# Patient Record
Sex: MALE | Race: WHITE | NOT HISPANIC OR LATINO | Employment: UNEMPLOYED | URBAN - METROPOLITAN AREA
[De-identification: names, ages, dates, MRNs, and addresses within clinical notes are randomized per-mention and may not be internally consistent; named-entity substitution may affect disease eponyms.]

---

## 2020-02-05 ENCOUNTER — TELEPHONE (OUTPATIENT)
Dept: SCHEDULING | Facility: IMAGING CENTER | Age: 25
End: 2020-02-05

## 2020-03-11 ENCOUNTER — OFFICE VISIT (OUTPATIENT)
Dept: MEDICAL GROUP | Facility: MEDICAL CENTER | Age: 25
End: 2020-03-11
Payer: COMMERCIAL

## 2020-03-11 VITALS
BODY MASS INDEX: 18.82 KG/M2 | TEMPERATURE: 97.6 F | DIASTOLIC BLOOD PRESSURE: 72 MMHG | SYSTOLIC BLOOD PRESSURE: 108 MMHG | HEIGHT: 73 IN | OXYGEN SATURATION: 96 % | HEART RATE: 77 BPM | WEIGHT: 142 LBS | RESPIRATION RATE: 18 BRPM

## 2020-03-11 DIAGNOSIS — F31.81 BIPOLAR 2 DISORDER (HCC): ICD-10-CM

## 2020-03-11 DIAGNOSIS — Z00.00 WELL ADULT EXAM: ICD-10-CM

## 2020-03-11 DIAGNOSIS — Z11.3 ROUTINE SCREENING FOR STI (SEXUALLY TRANSMITTED INFECTION): ICD-10-CM

## 2020-03-11 DIAGNOSIS — F41.3 OTHER MIXED ANXIETY DISORDERS: ICD-10-CM

## 2020-03-11 DIAGNOSIS — F33.42 RECURRENT MAJOR DEPRESSIVE DISORDER, IN FULL REMISSION (HCC): ICD-10-CM

## 2020-03-11 PROBLEM — F32.A DEPRESSION: Status: ACTIVE | Noted: 2020-03-11

## 2020-03-11 PROBLEM — F41.9 ANXIETY DISORDER: Status: ACTIVE | Noted: 2020-03-11

## 2020-03-11 PROCEDURE — 99385 PREV VISIT NEW AGE 18-39: CPT | Performed by: INTERNAL MEDICINE

## 2020-03-11 RX ORDER — ALPRAZOLAM 0.25 MG/1
0.25 TABLET ORAL
Qty: 15 TAB | Refills: 0 | Status: SHIPPED | OUTPATIENT
Start: 2020-03-11 | End: 2020-03-26

## 2020-03-11 RX ORDER — SERTRALINE HYDROCHLORIDE 100 MG/1
100 TABLET, FILM COATED ORAL DAILY
COMMUNITY
End: 2020-03-11 | Stop reason: SDUPTHER

## 2020-03-11 RX ORDER — ALPRAZOLAM 0.25 MG/1
TABLET ORAL PRN
COMMUNITY
End: 2020-03-11 | Stop reason: SDUPTHER

## 2020-03-11 RX ORDER — SERTRALINE HYDROCHLORIDE 100 MG/1
100 TABLET, FILM COATED ORAL DAILY
Qty: 90 TAB | Refills: 1 | Status: SHIPPED | OUTPATIENT
Start: 2020-03-11 | End: 2020-09-21

## 2020-03-11 RX ORDER — LAMOTRIGINE 100 MG/1
100 TABLET ORAL 2 TIMES DAILY
COMMUNITY
End: 2020-03-11 | Stop reason: SDUPTHER

## 2020-03-11 RX ORDER — LAMOTRIGINE 100 MG/1
100 TABLET ORAL 2 TIMES DAILY
Qty: 180 TAB | Refills: 1 | Status: SHIPPED | OUTPATIENT
Start: 2020-03-11 | End: 2020-09-21

## 2020-03-11 SDOH — HEALTH STABILITY: MENTAL HEALTH: HOW MANY STANDARD DRINKS CONTAINING ALCOHOL DO YOU HAVE ON A TYPICAL DAY?: 1 OR 2

## 2020-03-11 SDOH — HEALTH STABILITY: MENTAL HEALTH: HOW OFTEN DO YOU HAVE A DRINK CONTAINING ALCOHOL?: 2-4 TIMES A MONTH

## 2020-03-11 ASSESSMENT — PATIENT HEALTH QUESTIONNAIRE - PHQ9: CLINICAL INTERPRETATION OF PHQ2 SCORE: 0

## 2020-03-11 NOTE — ASSESSMENT & PLAN NOTE
Since 2016, he has been on lamictal. Stable. He has not seen psychiatry in years and previous PCP has been refilling.

## 2020-03-11 NOTE — PROGRESS NOTES
New Patient to Establish      CC: health check up, med refill    HPI:   24 y.o. male came into clinic for above. From [place], came here for grad school (2/3 semester now), plan to stay in [place]. Currently in a relationship with .      Bipolar 2 disorder (HCC)  Since 2016, he has been on lamictal. Stable. He has not seen psychiatry in years and previous PCP has been refilling.    Recurrent major depressive disorder, in full remission (HCC)  Since childhood. Has been on medication since 10 years ago. Currently stable with zoloft.     Anxiety disorder  Uses prn alprazolam 0.25 mg once a month or once every few months depending on anxiety attacks.      ROS:   Constitutional: denies any fever, chills, weight loss, appetite loss  Eyes: denies any vision changes.   ENT: no hearing problem  Cardiovascular: denies any chest pain, palpitations   Respiratory: denies SOB, cough, runny nose, sore throat  GI: denies abdominal pain, N/V, constipation, diarrhea  : denies any trouble urinating, dyuria, frequency, urgency  Musculo-skeletal: denies muscle weakness, joint pain  Skin: normal  Neurological: denies weakness, numbness, unsteadiness  Psychological: Mood is at baseline. denies anxiety or depression        Patient Active Problem List    Diagnosis Date Noted   • Bipolar 2 disorder (HCC) 03/11/2020   • Recurrent major depressive disorder, in full remission (HCC) 03/11/2020   • Anxiety disorder 03/11/2020       History reviewed. No pertinent past medical history.    Current Outpatient Medications   Medication Sig Dispense Refill   • sertraline (ZOLOFT) 100 MG Tab Take 1 Tab by mouth every day. 90 Tab 1   • lamoTRIgine (LAMICTAL) 100 MG Tab Take 1 Tab by mouth 2 Times a Day. 180 Tab 1   • ALPRAZolam (XANAX) 0.25 MG Tab Take 1 Tab by mouth 1 time daily as needed for up to 15 days. 15 Tab 0     No current facility-administered medications for this visit.        Allergies as of 03/11/2020   • (Not on File)       Social History      Socioeconomic History   • Marital status: Single     Spouse name: Not on file   • Number of children: Not on file   • Years of education: Not on file   • Highest education level: Not on file   Occupational History   • Not on file   Social Needs   • Financial resource strain: Not on file   • Food insecurity     Worry: Not on file     Inability: Not on file   • Transportation needs     Medical: Not on file     Non-medical: Not on file   Tobacco Use   • Smoking status: Never Smoker   • Smokeless tobacco: Never Used   Substance and Sexual Activity   • Alcohol use: Yes     Alcohol/week: 0.6 oz     Types: 1 Standard drinks or equivalent per week     Frequency: 2-4 times a month     Drinks per session: 1 or 2   • Drug use: Never   • Sexual activity: Yes     Comment: in a relationship   Lifestyle   • Physical activity     Days per week: Not on file     Minutes per session: Not on file   • Stress: Not on file   Relationships   • Social connections     Talks on phone: Not on file     Gets together: Not on file     Attends Restorationist service: Not on file     Active member of club or organization: Not on file     Attends meetings of clubs or organizations: Not on file     Relationship status: Not on file   • Intimate partner violence     Fear of current or ex partner: Not on file     Emotionally abused: Not on file     Physically abused: Not on file     Forced sexual activity: Not on file   Other Topics Concern   • Not on file   Social History Narrative    In graduate school       Family History   Problem Relation Age of Onset   • Psychiatric Illness Mother    • Mult Sclerosis Mother    • Psychiatric Illness Father    • Cancer Father         melanoma in remission   • Pulmonary Embolism Father    • Psychiatric Illness Brother        Past Surgical History:   Procedure Laterality Date   • OTHER ORTHOPEDIC SURGERY      surgery for pectus excavatum         /72 (BP Location: Right arm, Patient Position: Sitting, BP Cuff  "Size: Adult)   Pulse 77   Temp 36.4 °C (97.6 °F) (Temporal)   Resp 18   Ht 1.854 m (6' 1\")   Wt 64.4 kg (142 lb)   SpO2 96%   BMI 18.73 kg/m²     Physical Exam  General: Alert and oriented, No apparent distress.  Eyes: Pupils are equal and reactive. No scleral icterus.  Throat: Clear no erythema or exudates noted.  Neck: Supple. No cervical or supraclavicular lymphadenopathy noted. Thyroid not enlarged.  Lungs: Clear to auscultation bilaterally without any wheezing, crepitations.  Cardiovascular: Regular rate and rhythm. No murmurs, rubs or gallops.  Abdomen: Bowel sound +, soft, non tender, no rebound or guarding, no palpable organomegaly  Extremities: No clubbing, cyanosis, edema.  Skin: No rash or suspicious skin lesions noted.  Neuro: A & O x 4. Normal speech and memory. Motor and sensory grossly normal.        Assessment and Plan    1. Well adult exam  - he started running as physical exercise.  Currently maintaining healthy weight.  - Recommend sunscreen use outdoors.  - STD screening offered.  - Vaccine records requested  - CBC WITH DIFFERENTIAL; Future  - Fasting glucose screening. comp Metabolic Panel; Future  - lipid Profile; Future    2. Routine screening for STI (sexually transmitted infection)  - Chlamydia/GC PCR Urine Or Swab; Future  - HEPATITIS PANEL ACUTE(4 COMPONENTS); Future  - ANTIBODY,TREPONEMA PALLIDUM; Future  - HIV AG/AB COMBO ASSAY SCREENING; Future    3. Bipolar 2 disorder (HCC)  - refilled lamoTRIgine (LAMICTAL) 100 MG Tab; Take 1 Tab by mouth 2 Times a Day.  Dispense: 180 Tab; Refill: 1    4. Other mixed anxiety disorders  -  checked, no history in [place]. Counseled to avoid driving or EtOH when taking BZ, long term effects of BZ.   - refilled ALPRAZolam (XANAX) 0.25 MG Tab; Take 1 Tab by mouth 1 time daily as needed for up to 15 days.  Dispense: 15 Tab; Refill: 0  - TSH WITH REFLEX TO FT4; Future    5. Recurrent major depressive disorder, in full remission (HCC)  - refilled " sertraline (ZOLOFT) 100 MG Tab; Take 1 Tab by mouth every day.  Dispense: 90 Tab; Refill: 1        Followup: Return in about 1 year (around 3/11/2021) for Annual wellness visit.      Signed by: Nena Corarl M.D.

## 2020-07-08 ENCOUNTER — OFFICE VISIT (OUTPATIENT)
Dept: MEDICAL GROUP | Facility: MEDICAL CENTER | Age: 25
End: 2020-07-08
Payer: COMMERCIAL

## 2020-07-08 ENCOUNTER — HOSPITAL ENCOUNTER (OUTPATIENT)
Dept: RADIOLOGY | Facility: MEDICAL CENTER | Age: 25
End: 2020-07-08
Attending: PHYSICIAN ASSISTANT
Payer: COMMERCIAL

## 2020-07-08 VITALS
SYSTOLIC BLOOD PRESSURE: 106 MMHG | OXYGEN SATURATION: 96 % | BODY MASS INDEX: 17.53 KG/M2 | DIASTOLIC BLOOD PRESSURE: 60 MMHG | TEMPERATURE: 98.6 F | HEART RATE: 75 BPM | RESPIRATION RATE: 16 BRPM | WEIGHT: 136.6 LBS | HEIGHT: 74 IN

## 2020-07-08 DIAGNOSIS — Z76.89 ENCOUNTER TO ESTABLISH CARE WITH NEW DOCTOR: ICD-10-CM

## 2020-07-08 DIAGNOSIS — Z23 NEED FOR VACCINATION: ICD-10-CM

## 2020-07-08 DIAGNOSIS — I86.1 VARICOCELE: ICD-10-CM

## 2020-07-08 DIAGNOSIS — F31.81 BIPOLAR 2 DISORDER (HCC): ICD-10-CM

## 2020-07-08 PROCEDURE — 90472 IMMUNIZATION ADMIN EACH ADD: CPT | Performed by: PHYSICIAN ASSISTANT

## 2020-07-08 PROCEDURE — 90715 TDAP VACCINE 7 YRS/> IM: CPT | Performed by: PHYSICIAN ASSISTANT

## 2020-07-08 PROCEDURE — 99214 OFFICE O/P EST MOD 30 MIN: CPT | Mod: 25 | Performed by: PHYSICIAN ASSISTANT

## 2020-07-08 PROCEDURE — 90651 9VHPV VACCINE 2/3 DOSE IM: CPT | Performed by: PHYSICIAN ASSISTANT

## 2020-07-08 PROCEDURE — 90716 VAR VACCINE LIVE SUBQ: CPT | Performed by: PHYSICIAN ASSISTANT

## 2020-07-08 PROCEDURE — 90471 IMMUNIZATION ADMIN: CPT | Performed by: PHYSICIAN ASSISTANT

## 2020-07-08 PROCEDURE — 76870 US EXAM SCROTUM: CPT

## 2020-07-08 NOTE — PROGRESS NOTES
"Subjective:   CC:   Chief Complaint   Patient presents with   • Establish Care     Varicoselle check, options available for treatment       Roger James is a 24 y.o. male here today for establishing care.  Prior to the student of your medical maternal exam.    States he has had varicocele for a long time and left testicle.  Has not noticed it getting bigger, not painful, sometimes he palpates the area and area feels like lump.  Patient has bipolar 2, currently takes Lamictal 100 mg  twice daily and  Zoloft 100 Mg daily.      Current medicines (including changes today)  Current Outpatient Medications   Medication Sig Dispense Refill   • sertraline (ZOLOFT) 100 MG Tab Take 1 Tab by mouth every day. 90 Tab 1   • lamoTRIgine (LAMICTAL) 100 MG Tab Take 1 Tab by mouth 2 Times a Day. 180 Tab 1     No current facility-administered medications for this visit.          Past medical, surgical, family, and social history are reviewed in Epic chart by me today.   Medications and allergies reviewed in Epic chart by me today.         ROS   No chest pain, no shortness of breath, no abdominal pain  As documented in history of present illness above     Objective:     /60 (BP Location: Right arm, Patient Position: Sitting, BP Cuff Size: Adult long)   Pulse 75   Temp 37 °C (98.6 °F) (Temporal)   Resp 16   Ht 1.88 m (6' 2\")   Wt 62 kg (136 lb 9.6 oz)   SpO2 96%  Body mass index is 17.54 kg/m².   Physical Exam:  Constitutional: Alert, oriented in no acute distress.  Psych: Eye contact is good, speech goal directed, affect calm  Eyes: Conjunctiva non-injected, sclera non-icteric.  ENMT: Ears:Pinna normal. TM pearly gray.               Lips without lesions, Clear oropharynx, mucous membranes pink and moist.  Neck: No cervical or supraclavicular lymphadenopathy,Trachea midline, no thyromegaly, no masses  Lungs: Unlabored respiratory effort, clear to auscultation bilaterally with good excursion, no wheez or rhonci  CV: " regular rate and rhythm. No lower extremity edema  Abdomen: soft, nontender, No CVAT  Skin: no lesions in visible areas.  Ext: no edema, color normal, vascularity normal, temperature normal        Assessment and Plan:   The following treatment plan was discussed    1. Need for vaccination    - 9VHPV Vaccine 2-3 Dose (GARDASIL 9)  - Varicella Vaccine SQ  - Tdap Vaccine =>6YO IM    2. Varicocele    - [id number]; Future    3. Encounter to establish care with new doctor    4.  Bipolar 2  Stable, does not have a psychiatrist.  We will refill his medication as needed.    Followup: prn          Please note that this dictation was created using voice recognition software. I have made every reasonable attempt to correct obvious errors, but I expect that there are errors of grammar and possibly content that I did not discover before finalizing the note.

## 2020-09-20 DIAGNOSIS — F31.81 BIPOLAR 2 DISORDER (HCC): ICD-10-CM

## 2020-09-20 DIAGNOSIS — F33.42 RECURRENT MAJOR DEPRESSIVE DISORDER, IN FULL REMISSION (HCC): ICD-10-CM

## 2020-09-21 RX ORDER — LAMOTRIGINE 100 MG/1
TABLET ORAL
Qty: 180 TAB | Refills: 0 | Status: SHIPPED | OUTPATIENT
Start: 2020-09-21 | End: 2020-12-21 | Stop reason: SDUPTHER

## 2020-09-21 RX ORDER — SERTRALINE HYDROCHLORIDE 100 MG/1
TABLET, FILM COATED ORAL
Qty: 90 TAB | Refills: 0 | Status: SHIPPED | OUTPATIENT
Start: 2020-09-21 | End: 2020-12-21 | Stop reason: SDUPTHER

## 2020-12-21 DIAGNOSIS — F33.42 RECURRENT MAJOR DEPRESSIVE DISORDER, IN FULL REMISSION (HCC): ICD-10-CM

## 2020-12-21 DIAGNOSIS — F31.81 BIPOLAR 2 DISORDER (HCC): ICD-10-CM

## 2020-12-22 ENCOUNTER — TELEPHONE (OUTPATIENT)
Dept: MEDICAL GROUP | Facility: MEDICAL CENTER | Age: 25
End: 2020-12-22

## 2020-12-22 RX ORDER — SERTRALINE HYDROCHLORIDE 100 MG/1
100 TABLET, FILM COATED ORAL
Qty: 90 TAB | Refills: 0 | Status: SHIPPED | OUTPATIENT
Start: 2020-12-22 | End: 2021-03-19 | Stop reason: SDUPTHER

## 2020-12-22 RX ORDER — LAMOTRIGINE 100 MG/1
TABLET ORAL
Qty: 180 TAB | Refills: 0 | Status: SHIPPED | OUTPATIENT
Start: 2020-12-22 | End: 2021-03-19 | Stop reason: SDUPTHER

## 2021-01-06 ENCOUNTER — HOSPITAL ENCOUNTER (EMERGENCY)
Facility: MEDICAL CENTER | Age: 26
End: 2021-01-06
Attending: EMERGENCY MEDICINE
Payer: COMMERCIAL

## 2021-01-06 ENCOUNTER — APPOINTMENT (OUTPATIENT)
Dept: RADIOLOGY | Facility: MEDICAL CENTER | Age: 26
End: 2021-01-06
Attending: EMERGENCY MEDICINE
Payer: COMMERCIAL

## 2021-01-06 VITALS
HEIGHT: 74 IN | HEART RATE: 70 BPM | TEMPERATURE: 98.1 F | SYSTOLIC BLOOD PRESSURE: 122 MMHG | WEIGHT: 135.8 LBS | BODY MASS INDEX: 17.43 KG/M2 | OXYGEN SATURATION: 100 % | DIASTOLIC BLOOD PRESSURE: 78 MMHG | RESPIRATION RATE: 16 BRPM

## 2021-01-06 DIAGNOSIS — F31.81 BIPOLAR 2 DISORDER (HCC): ICD-10-CM

## 2021-01-06 DIAGNOSIS — F41.9 ANXIETY DISORDER, UNSPECIFIED TYPE: ICD-10-CM

## 2021-01-06 DIAGNOSIS — R07.9 ACUTE CHEST PAIN: ICD-10-CM

## 2021-01-06 DIAGNOSIS — R06.02 SHORTNESS OF BREATH: ICD-10-CM

## 2021-01-06 LAB
ALBUMIN SERPL BCP-MCNC: 5.1 G/DL (ref 3.2–4.9)
ALBUMIN/GLOB SERPL: 1.7 G/DL
ALP SERPL-CCNC: 83 U/L (ref 30–99)
ALT SERPL-CCNC: 15 U/L (ref 2–50)
ANION GAP SERPL CALC-SCNC: 6 MMOL/L (ref 7–16)
AST SERPL-CCNC: 23 U/L (ref 12–45)
BASOPHILS # BLD AUTO: 0.6 % (ref 0–1.8)
BASOPHILS # BLD: 0.03 K/UL (ref 0–0.12)
BILIRUB SERPL-MCNC: 0.7 MG/DL (ref 0.1–1.5)
BUN SERPL-MCNC: 13 MG/DL (ref 8–22)
CALCIUM SERPL-MCNC: 9.4 MG/DL (ref 8.5–10.5)
CHLORIDE SERPL-SCNC: 102 MMOL/L (ref 96–112)
CO2 SERPL-SCNC: 27 MMOL/L (ref 20–33)
COVID ORDER STATUS COVID19: NORMAL
CREAT SERPL-MCNC: 0.79 MG/DL (ref 0.5–1.4)
D DIMER PPP IA.FEU-MCNC: 0.28 UG/ML (FEU) (ref 0–0.5)
EKG IMPRESSION: NORMAL
EOSINOPHIL # BLD AUTO: 0.13 K/UL (ref 0–0.51)
EOSINOPHIL NFR BLD: 2.8 % (ref 0–6.9)
ERYTHROCYTE [DISTWIDTH] IN BLOOD BY AUTOMATED COUNT: 40.7 FL (ref 35.9–50)
GLOBULIN SER CALC-MCNC: 3 G/DL (ref 1.9–3.5)
GLUCOSE SERPL-MCNC: 90 MG/DL (ref 65–99)
HCT VFR BLD AUTO: 46.4 % (ref 42–52)
HGB BLD-MCNC: 15.4 G/DL (ref 14–18)
IMM GRANULOCYTES # BLD AUTO: 0.01 K/UL (ref 0–0.11)
IMM GRANULOCYTES NFR BLD AUTO: 0.2 % (ref 0–0.9)
LIPASE SERPL-CCNC: 35 U/L (ref 11–82)
LYMPHOCYTES # BLD AUTO: 1.99 K/UL (ref 1–4.8)
LYMPHOCYTES NFR BLD: 42.7 % (ref 22–41)
MCH RBC QN AUTO: 30.5 PG (ref 27–33)
MCHC RBC AUTO-ENTMCNC: 33.2 G/DL (ref 33.7–35.3)
MCV RBC AUTO: 91.9 FL (ref 81.4–97.8)
MONOCYTES # BLD AUTO: 0.28 K/UL (ref 0–0.85)
MONOCYTES NFR BLD AUTO: 6 % (ref 0–13.4)
NEUTROPHILS # BLD AUTO: 2.22 K/UL (ref 1.82–7.42)
NEUTROPHILS NFR BLD: 47.7 % (ref 44–72)
NRBC # BLD AUTO: 0 K/UL
NRBC BLD-RTO: 0 /100 WBC
PLATELET # BLD AUTO: 206 K/UL (ref 164–446)
PMV BLD AUTO: 10.8 FL (ref 9–12.9)
POTASSIUM SERPL-SCNC: 4.1 MMOL/L (ref 3.6–5.5)
PROT SERPL-MCNC: 8.1 G/DL (ref 6–8.2)
RBC # BLD AUTO: 5.05 M/UL (ref 4.7–6.1)
SARS-COV-2 RNA RESP QL NAA+PROBE: NOTDETECTED
SODIUM SERPL-SCNC: 135 MMOL/L (ref 135–145)
SPECIMEN SOURCE: NORMAL
TROPONIN T SERPL-MCNC: <6 NG/L (ref 6–19)
WBC # BLD AUTO: 4.7 K/UL (ref 4.8–10.8)

## 2021-01-06 PROCEDURE — 700102 HCHG RX REV CODE 250 W/ 637 OVERRIDE(OP): Performed by: EMERGENCY MEDICINE

## 2021-01-06 PROCEDURE — 99283 EMERGENCY DEPT VISIT LOW MDM: CPT

## 2021-01-06 PROCEDURE — 84484 ASSAY OF TROPONIN QUANT: CPT

## 2021-01-06 PROCEDURE — 71046 X-RAY EXAM CHEST 2 VIEWS: CPT

## 2021-01-06 PROCEDURE — U0003 INFECTIOUS AGENT DETECTION BY NUCLEIC ACID (DNA OR RNA); SEVERE ACUTE RESPIRATORY SYNDROME CORONAVIRUS 2 (SARS-COV-2) (CORONAVIRUS DISEASE [COVID-19]), AMPLIFIED PROBE TECHNIQUE, MAKING USE OF HIGH THROUGHPUT TECHNOLOGIES AS DESCRIBED BY CMS-2020-01-R: HCPCS

## 2021-01-06 PROCEDURE — 85025 COMPLETE CBC W/AUTO DIFF WBC: CPT

## 2021-01-06 PROCEDURE — A9270 NON-COVERED ITEM OR SERVICE: HCPCS | Performed by: EMERGENCY MEDICINE

## 2021-01-06 PROCEDURE — 93005 ELECTROCARDIOGRAM TRACING: CPT | Performed by: EMERGENCY MEDICINE

## 2021-01-06 PROCEDURE — 83690 ASSAY OF LIPASE: CPT

## 2021-01-06 PROCEDURE — 85379 FIBRIN DEGRADATION QUANT: CPT

## 2021-01-06 PROCEDURE — C9803 HOPD COVID-19 SPEC COLLECT: HCPCS | Performed by: EMERGENCY MEDICINE

## 2021-01-06 PROCEDURE — 80053 COMPREHEN METABOLIC PANEL: CPT

## 2021-01-06 PROCEDURE — 93005 ELECTROCARDIOGRAM TRACING: CPT

## 2021-01-06 PROCEDURE — U0005 INFEC AGEN DETEC AMPLI PROBE: HCPCS

## 2021-01-06 RX ORDER — ALBUTEROL SULFATE 90 UG/1
2 AEROSOL, METERED RESPIRATORY (INHALATION) ONCE
Status: COMPLETED | OUTPATIENT
Start: 2021-01-06 | End: 2021-01-06

## 2021-01-06 RX ORDER — ALBUTEROL SULFATE 90 UG/1
2 AEROSOL, METERED RESPIRATORY (INHALATION) EVERY 6 HOURS PRN
Qty: 8.5 G | Refills: 0 | Status: SHIPPED | OUTPATIENT
Start: 2021-01-06

## 2021-01-06 RX ORDER — ASPIRIN 81 MG/1
324 TABLET, CHEWABLE ORAL ONCE
Status: COMPLETED | OUTPATIENT
Start: 2021-01-06 | End: 2021-01-06

## 2021-01-06 RX ADMIN — ASPIRIN 324 MG: 81 TABLET, CHEWABLE ORAL at 12:16

## 2021-01-06 RX ADMIN — ALBUTEROL SULFATE 2 PUFF: 90 AEROSOL, METERED RESPIRATORY (INHALATION) at 12:16

## 2021-01-06 NOTE — PROGRESS NOTES
Spiritual Care Note    Patient Information     Patient's Name: Roger James   MRN: [id number]    YOB: 1995   Age and Gender: 25 y.o. male   Service Area: ED [facility]   Room [facility]and Bed): [facility]   Ethnicity or Nationality:     Primary Language: English   Baptist/Spiritual preference: Synagogue   Place of Residence: [place]   Family/Friends/Others Present: No   Clinical Team Present: No   Medical Diagnosis(-es)/Procedure(s): SOB   Code Status: No Order    Date of Admission: 1/6/2021   Length of Stay: 0 days        Spiritual Care Provider Information:  Name of Spiritual Care Provider: Vee Moreno  Title of Spiritual Care Provider: Associate   Phone Number: [phone]  E-mail: [email or web address]  Total time : 10 minutes    Spiritual Screen Results:    Gen Nursing        Palliative Care         Encounter/Request Information  Encounter/Request Type   Visited With: Patient  Nature of the Visit: Initial, On shift  General Visit: Yes  Referral From/ Origin of Request: [place] rounds, Verbal patient    Religous Needs/Values  Baptist Needs Visit  Baptist Needs: Prayer    Spiritual Assessment     Spiritual Care Encounters    Observations/Symptoms: Accepting, Thankfulness    Interaction/Conversation: Pt requested prayer and thanked the .    Assessment: Need    Need: Seeking Spiritual Assistance and Support    Interventions: Compassionate presence, prayer.    Outcomes: Spiritual Comfort    Plan: Visit Upon Request    Notes:

## 2021-01-06 NOTE — ED NOTES
Pt to rm 55.  Ambulatory with strong, steady gait.  Pt currently w/o complaint.  States chest discomfort was transient and feels it is related to him not sleeping enough

## 2021-01-06 NOTE — ED PROVIDER NOTES
"ED Provider Note    CHIEF COMPLAINT  Chief Complaint   Patient presents with   • Shortness of Breath     Pt complains of SOB x 5 days that has been evaluated prior for this by pulmonologist. Pt state when he woke up he began to have left sided \"chest tightness\" PT denies exacerbating factors or anything that makes it better.    • Chest Pressure       HPI    Primary care provider: Thania Connelly P.A.-C.  Means of arrival: POV/walk-in  History obtained from: Patient  History limited by: Nothing    Roger Jm James is a 25 y.o. male who presents with dyspnea for 5 days.  He has had several episodes of this in the past and was even referred to pulmonary medicine and had extensive pulmonary testing without clear etiology.  Was given a bronchodilator at that time, but has since lost his inhaler.  For the last 5 days he has been having similar dyspnea, but over the last 2 days he started to develop some fairly constant left-sided anterior chest tightness/pain.  Mild in severity.  No aggravating factors.  No alleviating measures.  The chest pain is also described as pressure-like isolated over the left anterior chest.  No radiation to the shoulder neck or back.  No abdominal pain or vomiting.  No cough or fevers or known Covid contact.  No strong family history of coronary artery disease.  The patient has a history of anxiety and bipolar 2 and prior pectus excavatum repair many years ago.  He takes sertraline and Lamictal for his mood.  He does have increased stress lately he just graduated from grad school in [place] and is having a hard time finding a job.  No thoughts of self-harm.  He has had these episodes of dyspnea before but the chest pain is new which prompted him to come in for emergent evaluation.  No leg swelling or immobilization or travel or loss of sensation or weakness.    REVIEW OF SYSTEMS  Constitutional: Negative for fever or chills.   HENT: Negative for rhinorrhea or sore throat.  " "  Respiratory: Negative for cough but positive for shortness of breath.    Cardiovascular: Positive for chest pain but negative for syncope or palpitations.   Gastrointestinal: Negative for nausea, vomiting, or abdominal pain.   Genitourinary: Negative for dysuria or flank pain.   Musculoskeletal: Negative for extremity pain or swelling.   Skin: Negative for itching or rash.   Neurological: Negative for sensory or motor changes.   Psychiatric/Behavioral: Positive for slightly increased stress and anxiousness, no thoughts of self-harm.  See HPI for further details. All other systems are negative.     PAST MEDICAL HISTORY  Anxiety and bipolar 2 disorder.    PAST FAMILY HISTORY  Family History   Problem Relation Age of Onset   • Psychiatric Illness Mother    • Mult Sclerosis Mother    • Psychiatric Illness Father    • Cancer Father         melanoma in remission   • Pulmonary Embolism Father    • Psychiatric Illness Brother        SOCIAL HISTORY  Social History     Tobacco Use   • Smoking status: Never Smoker   • Smokeless tobacco: Never Used   Substance and Sexual Activity   • Alcohol use: Yes     Alcohol/week: 0.6 oz     Types: 1 Standard drinks or equivalent per week     Frequency: 2-4 times a month     Drinks per session: 1 or 2   • Drug use: Never   • Sexual activity: Yes     Partners: Female     Comment: in a relationship       SURGICAL HISTORY   has a past surgical history that includes other orthopedic surgery.    CURRENT MEDICATIONS  Home Medications     Reviewed by Yossi Garcia R.N. (Registered Nurse) on 01/06/21 at 1129  Med List Status: Not Addressed   Medication Last Dose Status   lamoTRIgine (LAMICTAL) 100 MG Tab  Active   sertraline (ZOLOFT) 100 MG Tab  Active                ALLERGIES  No Known Allergies    PHYSICAL EXAM  VITAL SIGNS: /78   Pulse 70   Temp 36.7 °C (98.1 °F) (Temporal)   Resp 16   Ht 1.88 m (6' 2\")   Wt 61.6 kg (135 lb 12.9 oz)   SpO2 100%   BMI 17.44 kg/m²    Pulse ox " interpretation: On room air, I interpret this pulse ox as normal.  Constitutional: Well-developed, well-nourished. Sitting up.   HEENT: Normocephalic, atraumatic. Posterior pharynx clear, mucous membranes moist.  Eyes:  EOMI. Normal sclerae.  Neck: Supple, nontender.  Chest/Pulmonary: Very slightly diminished to ausculation bilaterally, no wheezes or rhonchi.  Cardiovascular: Regular rate and rhythm, no murmur.   Abdomen: Soft, nontender; no rebound, guarding, or masses.  Back: No CVA or midline tenderness.   Musculoskeletal: No deformity or edema.  Neuro: Clear speech, normal coordination, cranial nerves II-XII grossly intact, no focal asymmetry or sensory deficits.   Psych: Slightly anxious appearing but very pleasant and cooperative.  Skin: No rashes, warm and dry.      DIAGNOSTIC STUDIES / PROCEDURES    LABS & EKG  Results for orders placed or performed during the hospital encounter of 01/06/21   TROPONIN   Result Value Ref Range    Troponin T <6 6 - 19 ng/L   CMP   Result Value Ref Range    Sodium 135 135 - 145 mmol/L    Potassium 4.1 3.6 - 5.5 mmol/L    Chloride 102 96 - 112 mmol/L    Co2 27 20 - 33 mmol/L    Anion Gap 6.0 (L) 7.0 - 16.0    Glucose 90 65 - 99 mg/dL    Bun 13 8 - 22 mg/dL    Creatinine 0.79 0.50 - 1.40 mg/dL    Calcium 9.4 8.5 - 10.5 mg/dL    AST(SGOT) 23 12 - 45 U/L    ALT(SGPT) 15 2 - 50 U/L    Alkaline Phosphatase 83 30 - 99 U/L    Total Bilirubin 0.7 0.1 - 1.5 mg/dL    Albumin 5.1 (H) 3.2 - 4.9 g/dL    Total Protein 8.1 6.0 - 8.2 g/dL    Globulin 3.0 1.9 - 3.5 g/dL    A-G Ratio 1.7 g/dL   D-DIMER   Result Value Ref Range    D-Dimer Screen 0.28 0.00 - 0.50 ug/mL (FEU)   CBC WITH DIFFERENTIAL   Result Value Ref Range    WBC 4.7 (L) 4.8 - 10.8 K/uL    RBC 5.05 4.70 - 6.10 M/uL    Hemoglobin 15.4 14.0 - 18.0 g/dL    Hematocrit 46.4 42.0 - 52.0 %    MCV 91.9 81.4 - 97.8 fL    MCH 30.5 27.0 - 33.0 pg    MCHC 33.2 (L) 33.7 - 35.3 g/dL    RDW 40.7 35.9 - 50.0 fL    Platelet Count 206 164 - 446 K/uL     MPV 10.8 9.0 - 12.9 fL    Neutrophils-Polys 47.70 44.00 - 72.00 %    Lymphocytes 42.70 (H) 22.00 - 41.00 %    Monocytes 6.00 0.00 - 13.40 %    Eosinophils 2.80 0.00 - 6.90 %    Basophils 0.60 0.00 - 1.80 %    Immature Granulocytes 0.20 0.00 - 0.90 %    Nucleated RBC 0.00 /100 WBC    Neutrophils (Absolute) 2.22 1.82 - 7.42 K/uL    Lymphs (Absolute) 1.99 1.00 - 4.80 K/uL    Monos (Absolute) 0.28 0.00 - 0.85 K/uL    Eos (Absolute) 0.13 0.00 - 0.51 K/uL    Baso (Absolute) 0.03 0.00 - 0.12 K/uL    Immature Granulocytes (abs) 0.01 0.00 - 0.11 K/uL    NRBC (Absolute) 0.00 K/uL   LIPASE   Result Value Ref Range    Lipase 35 11 - 82 U/L   COVID/SARS CoV-2 PCR    Specimen: Nasopharyngeal; Respirate   Result Value Ref Range    COVID Order Status Received    SARS-CoV-2, PCR (In-House)   Result Value Ref Range    SARS-CoV-2 Source NP Swab     SARS-CoV-2 by PCR NotDetected    ESTIMATED GFR   Result Value Ref Range    GFR If African American >60 >60 mL/min/1.73 m 2    GFR If Non African American >60 >60 mL/min/1.73 m 2   EKG (NOW)   Result Value Ref Range    Report       [facility] Emergency Dept[facility]    Test Date:  2021  Pt Name:    MOHIT LEDESMA               Department: ER  MRN:        [id number]                      Room:  Gender:     Male                         Technician: [id number]  :        1995                   Requested By:ER TRIAGE PROTOCOL  Order #:    [id number]                    Reading MD: William Gillespie MD    Measurements  Intervals                                Axis  Rate:       62                           P:          45  CO:         156                          QRS:        64  QRSD:       106                          T:          47  QT:         392  QTc:        398    Interpretive Statements  SINUS RHYTHM  PROBABLE LEFT ATRIAL ABNORMALITY  INCOMPLETE RIGHT BUNDLE BRANCH BLOCK  No previous ECG available for comparison  No STEMI or strain or dysrhythmia  Electronically Signed On  1-6-2021 16:55:15 PST by William Gillespie MD         RADIOLOGY  DX-CHEST-2 VIEWS   Final Result      No active disease.          COURSE & MEDICAL DECISION MAKING    This is a 25 y.o. male who presents with dyspnea for 5 days chest pain for 2 days.  Normal vital signs.  No known Covid contact.    Differential Diagnosis includes but is not limited to:  ACS, PE, mild/pericarditis, reactive airways disease, stress/anxiety    ED Course:  This is a very pleasant 25-year-old male coming in with 5 days of dyspnea 2 days of chest pain.  Plan EKG chest x-ray labs and close reevaluation.  I will treat him with aspirin and albuterol inhaler puffs.    Thankfully work-up today is reassuring.  Overall risk of clot is low dimer used to screen for PE this is negative highly doubt pulmonary embolism or clot.  EKG reassuring, troponin negative highly doubt ACS.  White count normal no fevers here doubt serious infection.  Electrolytes are stable without acidosis.  LFTs and lipase reassuring doubt hepatobiliary disease mimic.  Covid testing negative.  Chest x-ray clear.    On recheck patient feeling better and very relieved with his reassuring work-up.  No obvious life-threatening cause of his presentation today, I think he is safe for discharge and follow-up with his PCP for further cardiac risk stratification, however I will be diagnosing him with chest pain, and he will return immediately for any new or worsening symptoms.    Medications   albuterol inhaler 2 Puff (2 Puffs Inhalation Given 1/6/21 1216)   aspirin (ASA) chewable tab 324 mg (324 mg Oral Given 1/6/21 1216)       FINAL IMPRESSION  1. Acute chest pain    2. Shortness of breath    3. Anxiety disorder, unspecified type    4. Bipolar 2 disorder (HCC)        PRESCRIPTIONS  Discharge Medication List as of 1/6/2021  1:47 PM      START taking these medications    Details   albuterol 108 (90 Base) MCG/ACT Aero Soln inhalation aerosol Inhale 2 Puffs every 6 hours as needed for  Shortness of Breath., Disp-8.5 g, R-0, Normal             FOLLOW UP  Thania Connelly P.A.-C.  [address]  [address]  [place] [address] [phone]    Schedule an appointment as soon as possible for a visit in 2 days  for recheck and routine care    [facility], Emergency Dept  [address] [address] [phone]  Today  If you have ANY new or worse symptoms!      -DISCHARGE-       Results, exam findings, clinical impression, presumed diagnosis, treatment options, and strict return precautions were discussed with the patient, and they verbalized understanding, agreed with, and appreciated the plan of care.    Pertinent Labs & Imaging studies reviewed and verified by myself, as well as nursing notes and the patient's past medical, family, and social histories (See chart for details).    The patient is referred to a primary physician for blood pressure management, diabetic screening, and for all other preventative health concerns.     Portions of this record were made with voice recognition software.  Despite my review, spelling/grammar/context errors may still remain.  Interpretation of this chart should be taken in this context.    Electronically signed by William Gillespie M.D. on 1/6/2021 at 5:42 PM.

## 2021-01-06 NOTE — ED TRIAGE NOTES
"Roger James  Chief Complaint   Patient presents with   • Shortness of Breath     Pt complains of SOB x 5 days that has been evaluated prior for this by pulmonologist. Pt state when he woke up he began to have left sided \"chest tightness\" PT denies exacerbating factors or anything that makes it better.    • Chest Pressure     Pt ambulatory to triage with above complaint.     /84   Pulse 72   Temp 36.7 °C (98.1 °F) (Temporal)   Resp 16   Ht 1.88 m (6' 2\")   Wt 61.6 kg (135 lb 12.9 oz)   SpO2 100%   BMI 17.44 kg/m²     Pt informed of triage process and encouraged to notify staff of any changes or concerns. Pt verbalized understanding of instructions. Apologized for long wait time. Pt placed back in lobby.     " NONE

## 2021-01-06 NOTE — DISCHARGE INSTRUCTIONS
You were seen and evaluated in the Emergency Department at [facility] for:     Chest pain and tightness and shortness of breath.    You had the following tests and studies:    Thankfully, your work-up today is reassuring.  We do not see any damage to your heart, your lungs are clear, your vital signs are normal.  We see no dangerous cause of your symptoms so I think you are safe to go home and follow-up with your primary care provider soon as possible.    You received the following medications:    Aspirin, albuterol.    You received the following prescriptions:    Albuterol, use as needed.  ----------------------------    Please make sure to follow up with:    Your primary care provider for recheck and routine care, but if you have any worsening chest pain or tightness or trouble breathing or fevers or any other concerns return to the ER immediately.    Good luck, we hope you get better soon!  ----------------------------    We always encourage patients to return IMMEDIATELY if they have:  Increased or changing pain, passing out, fevers over 100.4 (taken in your mouth or rectally) for more than 2 days, redness or swelling of skin or tissues, feeling like your heart is beating fast, chest pain that is new or worsening, trouble breathing, feeling like your throat is closing up and can not breath, inability to walk, weakness of any part of your body, new dizziness, severe bleeding that won't stop from any part of your body, if you can't eat or drink, or if you have any other concerns.   If you feel worse, please know that you can always return with any questions, concerns, worse symptoms, or you are feeling unsafe. We certainly cannot say for sure that we have ruled out every illness or dangerous disease, but we feel that at this specific time, your exam, tests, and vital signs like heart rate and blood pressure are safe for discharge.

## 2021-03-19 ENCOUNTER — OFFICE VISIT (OUTPATIENT)
Dept: MEDICAL GROUP | Facility: MEDICAL CENTER | Age: 26
End: 2021-03-19
Payer: COMMERCIAL

## 2021-03-19 VITALS
TEMPERATURE: 98.1 F | SYSTOLIC BLOOD PRESSURE: 92 MMHG | HEART RATE: 72 BPM | BODY MASS INDEX: 17.58 KG/M2 | RESPIRATION RATE: 32 BRPM | DIASTOLIC BLOOD PRESSURE: 52 MMHG | OXYGEN SATURATION: 98 % | WEIGHT: 137 LBS | HEIGHT: 74 IN

## 2021-03-19 DIAGNOSIS — Z00.00 ANNUAL PHYSICAL EXAM: ICD-10-CM

## 2021-03-19 DIAGNOSIS — F31.81 BIPOLAR 2 DISORDER (HCC): ICD-10-CM

## 2021-03-19 DIAGNOSIS — F33.42 RECURRENT MAJOR DEPRESSIVE DISORDER, IN FULL REMISSION (HCC): ICD-10-CM

## 2021-03-19 PROCEDURE — 99395 PREV VISIT EST AGE 18-39: CPT | Performed by: PHYSICIAN ASSISTANT

## 2021-03-19 RX ORDER — SERTRALINE HYDROCHLORIDE 100 MG/1
100 TABLET, FILM COATED ORAL
Qty: 90 TABLET | Refills: 0 | Status: SHIPPED | OUTPATIENT
Start: 2021-03-19

## 2021-03-19 RX ORDER — LAMOTRIGINE 100 MG/1
TABLET ORAL
Qty: 180 TABLET | Refills: 0 | Status: SHIPPED | OUTPATIENT
Start: 2021-03-19

## 2021-03-19 ASSESSMENT — FIBROSIS 4 INDEX: FIB4 SCORE: 0.78

## 2021-03-19 NOTE — PROGRESS NOTES
"Chief Complaint   Patient presents with   • Annual Exam       HPI  Roger James is a 25 y.o. male here today for annual exam.  Patient has graduated from [employer] masters in [facility] and is moving to [place] with his fiancée.  Denies any concerns or questions.  Has been able to exercise a little more.  Patient continues on Zoloft and Lamictal for bipolar disorder.  Mood has been stable, no depressive or manic episodes for long time perforation              Exam:  BP (!) 92/52 (BP Location: Right arm, Patient Position: Sitting)   Pulse 72   Temp 36.7 °C (98.1 °F) (Temporal)   Resp (!) 32   Ht 1.88 m (6' 2\")   Wt 62.1 kg (137 lb)   SpO2 98%   Constitutional: Alert, no distress, plus 3 vital signs  Respiratory: Unlabored respiratory effort, lungs clear to auscultation, no wheezes, no rhonchi  Cardiovascular: RRR, no murmur, no lower extremities edema, pedal pulses equal bilaterally and 2+/4   Abdomen: No tenderness, no CVA tenderness, no rebound, no guarding  Ears:  External ears unremarkable. TMs pearly gray, clear and intact, w/o any perforation or effusion.  Physical Exam     Eyes  Pupils are equal, round, and reactive to light.     General -             Right: Right eye extraocular movements are normal.       Skin  Skin is warm and dry. No rash noted.     Psychiatric  He has a normal mood and affect. His behavior is normal. Thought content normal.       A/P:  1. Annual physical exam  Declines labs      2. Bipolar 2 disorder (HCC)  - lamoTRIgine (LAMICTAL) 100 MG Tab; TAKE 1 TABLET BY MOUTH TWICE DAILY  Dispense: 180 tablet; Refill: 0    3. Recurrent major depressive disorder, in full remission (HCC)    - sertraline (ZOLOFT) 100 MG Tab; Take 1 tablet by mouth every day.  Dispense: 90 tablet; Refill: 0        F/U: Patient is [place] out of [place]  Prn   "